# Patient Record
Sex: FEMALE | Race: WHITE | NOT HISPANIC OR LATINO | Employment: OTHER | URBAN - METROPOLITAN AREA
[De-identification: names, ages, dates, MRNs, and addresses within clinical notes are randomized per-mention and may not be internally consistent; named-entity substitution may affect disease eponyms.]

---

## 2023-10-30 ENCOUNTER — APPOINTMENT (OUTPATIENT)
Dept: RADIOLOGY | Facility: HOSPITAL | Age: 63
End: 2023-10-30
Payer: MEDICARE

## 2023-10-30 ENCOUNTER — HOSPITAL ENCOUNTER (OUTPATIENT)
Facility: HOSPITAL | Age: 63
Setting detail: OBSERVATION
Discharge: HOME | End: 2023-10-31
Attending: STUDENT IN AN ORGANIZED HEALTH CARE EDUCATION/TRAINING PROGRAM | Admitting: INTERNAL MEDICINE
Payer: MEDICARE

## 2023-10-30 DIAGNOSIS — E53.8 FOLATE DEFICIENCY: ICD-10-CM

## 2023-10-30 DIAGNOSIS — R06.02 SHORTNESS OF BREATH: Primary | ICD-10-CM

## 2023-10-30 DIAGNOSIS — R53.1 WEAKNESS: ICD-10-CM

## 2023-10-30 DIAGNOSIS — R26.81 UNSTEADY GAIT: ICD-10-CM

## 2023-10-30 DIAGNOSIS — D75.89 MACROCYTOSIS WITHOUT ANEMIA: ICD-10-CM

## 2023-10-30 DIAGNOSIS — F17.200 SMOKER: ICD-10-CM

## 2023-10-30 DIAGNOSIS — R26.2 AMBULATORY DYSFUNCTION: ICD-10-CM

## 2023-10-30 LAB
ALBUMIN SERPL BCP-MCNC: 3.8 G/DL (ref 3.4–5)
ALP SERPL-CCNC: 120 U/L (ref 33–136)
ALT SERPL W P-5'-P-CCNC: 43 U/L (ref 7–45)
ANION GAP SERPL CALC-SCNC: 17 MMOL/L (ref 10–20)
AST SERPL W P-5'-P-CCNC: 39 U/L (ref 9–39)
BASOPHILS # BLD AUTO: 0.04 X10*3/UL (ref 0–0.1)
BASOPHILS NFR BLD AUTO: 0.3 %
BILIRUB SERPL-MCNC: 0.7 MG/DL (ref 0–1.2)
BUN SERPL-MCNC: 6 MG/DL (ref 6–23)
CALCIUM SERPL-MCNC: 9.1 MG/DL (ref 8.6–10.3)
CARDIAC TROPONIN I PNL SERPL HS: 5 NG/L (ref 0–13)
CHLORIDE SERPL-SCNC: 93 MMOL/L (ref 98–107)
CO2 SERPL-SCNC: 27 MMOL/L (ref 21–32)
CREAT SERPL-MCNC: 0.63 MG/DL (ref 0.5–1.05)
D DIMER PPP FEU-MCNC: 1143 NG/ML FEU
EOSINOPHIL # BLD AUTO: 0 X10*3/UL (ref 0–0.7)
EOSINOPHIL NFR BLD AUTO: 0 %
ERYTHROCYTE [DISTWIDTH] IN BLOOD BY AUTOMATED COUNT: 12.2 % (ref 11.5–14.5)
FLUAV RNA RESP QL NAA+PROBE: NOT DETECTED
FLUBV RNA RESP QL NAA+PROBE: NOT DETECTED
GFR SERPL CREATININE-BSD FRML MDRD: >90 ML/MIN/1.73M*2
GLUCOSE SERPL-MCNC: 89 MG/DL (ref 74–99)
HCT VFR BLD AUTO: 46.4 % (ref 36–46)
HGB BLD-MCNC: 16.5 G/DL (ref 12–16)
IMM GRANULOCYTES # BLD AUTO: 0.06 X10*3/UL (ref 0–0.7)
IMM GRANULOCYTES NFR BLD AUTO: 0.5 % (ref 0–0.9)
LYMPHOCYTES # BLD AUTO: 1.12 X10*3/UL (ref 1.2–4.8)
LYMPHOCYTES NFR BLD AUTO: 9.4 %
MCH RBC QN AUTO: 45 PG (ref 26–34)
MCHC RBC AUTO-ENTMCNC: 35.6 G/DL (ref 32–36)
MCV RBC AUTO: 126 FL (ref 80–100)
MONOCYTES # BLD AUTO: 0.97 X10*3/UL (ref 0.1–1)
MONOCYTES NFR BLD AUTO: 8.2 %
NEUTROPHILS # BLD AUTO: 9.7 X10*3/UL (ref 1.2–7.7)
NEUTROPHILS NFR BLD AUTO: 81.6 %
NRBC BLD-RTO: 0 /100 WBCS (ref 0–0)
PLATELET # BLD AUTO: 280 X10*3/UL (ref 150–450)
PMV BLD AUTO: 9.5 FL (ref 7.5–11.5)
POTASSIUM SERPL-SCNC: 3.5 MMOL/L (ref 3.5–5.3)
PROT SERPL-MCNC: 7 G/DL (ref 6.4–8.2)
RBC # BLD AUTO: 3.67 X10*6/UL (ref 4–5.2)
RBC MORPH BLD: NORMAL
SARS-COV-2 RNA RESP QL NAA+PROBE: NOT DETECTED
SODIUM SERPL-SCNC: 133 MMOL/L (ref 136–145)
STOMATOCYTES BLD QL SMEAR: NORMAL
WBC # BLD AUTO: 11.9 X10*3/UL (ref 4.4–11.3)

## 2023-10-30 PROCEDURE — 36415 COLL VENOUS BLD VENIPUNCTURE: CPT

## 2023-10-30 PROCEDURE — 99285 EMERGENCY DEPT VISIT HI MDM: CPT | Mod: 25 | Performed by: STUDENT IN AN ORGANIZED HEALTH CARE EDUCATION/TRAINING PROGRAM

## 2023-10-30 PROCEDURE — 80053 COMPREHEN METABOLIC PANEL: CPT | Performed by: STUDENT IN AN ORGANIZED HEALTH CARE EDUCATION/TRAINING PROGRAM

## 2023-10-30 PROCEDURE — 87636 SARSCOV2 & INF A&B AMP PRB: CPT | Performed by: STUDENT IN AN ORGANIZED HEALTH CARE EDUCATION/TRAINING PROGRAM

## 2023-10-30 PROCEDURE — 85025 COMPLETE CBC W/AUTO DIFF WBC: CPT | Performed by: EMERGENCY MEDICINE

## 2023-10-30 PROCEDURE — 84132 ASSAY OF SERUM POTASSIUM: CPT | Performed by: EMERGENCY MEDICINE

## 2023-10-30 PROCEDURE — 99285 EMERGENCY DEPT VISIT HI MDM: CPT | Performed by: STUDENT IN AN ORGANIZED HEALTH CARE EDUCATION/TRAINING PROGRAM

## 2023-10-30 PROCEDURE — 85060 BLOOD SMEAR INTERPRETATION: CPT | Performed by: STUDENT IN AN ORGANIZED HEALTH CARE EDUCATION/TRAINING PROGRAM

## 2023-10-30 PROCEDURE — 2550000001 HC RX 255 CONTRASTS: Performed by: STUDENT IN AN ORGANIZED HEALTH CARE EDUCATION/TRAINING PROGRAM

## 2023-10-30 PROCEDURE — 85379 FIBRIN DEGRADATION QUANT: CPT

## 2023-10-30 PROCEDURE — 85025 COMPLETE CBC W/AUTO DIFF WBC: CPT | Performed by: STUDENT IN AN ORGANIZED HEALTH CARE EDUCATION/TRAINING PROGRAM

## 2023-10-30 PROCEDURE — 71046 X-RAY EXAM CHEST 2 VIEWS: CPT

## 2023-10-30 PROCEDURE — 84484 ASSAY OF TROPONIN QUANT: CPT | Performed by: EMERGENCY MEDICINE

## 2023-10-30 PROCEDURE — 84484 ASSAY OF TROPONIN QUANT: CPT | Performed by: STUDENT IN AN ORGANIZED HEALTH CARE EDUCATION/TRAINING PROGRAM

## 2023-10-30 PROCEDURE — 36415 COLL VENOUS BLD VENIPUNCTURE: CPT | Performed by: EMERGENCY MEDICINE

## 2023-10-30 PROCEDURE — 71046 X-RAY EXAM CHEST 2 VIEWS: CPT | Performed by: STUDENT IN AN ORGANIZED HEALTH CARE EDUCATION/TRAINING PROGRAM

## 2023-10-30 PROCEDURE — 71275 CT ANGIOGRAPHY CHEST: CPT | Mod: MG

## 2023-10-30 PROCEDURE — 87636 SARSCOV2 & INF A&B AMP PRB: CPT | Performed by: EMERGENCY MEDICINE

## 2023-10-30 PROCEDURE — 71275 CT ANGIOGRAPHY CHEST: CPT | Performed by: SURGERY

## 2023-10-30 RX ADMIN — IOHEXOL 60 ML: 350 INJECTION, SOLUTION INTRAVENOUS at 22:58

## 2023-10-30 ASSESSMENT — COLUMBIA-SUICIDE SEVERITY RATING SCALE - C-SSRS
1. IN THE PAST MONTH, HAVE YOU WISHED YOU WERE DEAD OR WISHED YOU COULD GO TO SLEEP AND NOT WAKE UP?: NO
2. HAVE YOU ACTUALLY HAD ANY THOUGHTS OF KILLING YOURSELF?: NO
6. HAVE YOU EVER DONE ANYTHING, STARTED TO DO ANYTHING, OR PREPARED TO DO ANYTHING TO END YOUR LIFE?: NO

## 2023-10-30 ASSESSMENT — LIFESTYLE VARIABLES
REASON UNABLE TO ASSESS: NO
EVER HAD A DRINK FIRST THING IN THE MORNING TO STEADY YOUR NERVES TO GET RID OF A HANGOVER: NO
HAVE PEOPLE ANNOYED YOU BY CRITICIZING YOUR DRINKING: NO
EVER FELT BAD OR GUILTY ABOUT YOUR DRINKING: NO
HAVE YOU EVER FELT YOU SHOULD CUT DOWN ON YOUR DRINKING: NO

## 2023-10-30 ASSESSMENT — PAIN - FUNCTIONAL ASSESSMENT: PAIN_FUNCTIONAL_ASSESSMENT: 0-10

## 2023-10-31 ENCOUNTER — HOME HEALTH ADMISSION (OUTPATIENT)
Dept: HOME HEALTH SERVICES | Facility: HOME HEALTH | Age: 63
End: 2023-10-31

## 2023-10-31 ENCOUNTER — APPOINTMENT (OUTPATIENT)
Dept: CARDIOLOGY | Facility: HOSPITAL | Age: 63
End: 2023-10-31
Payer: MEDICARE

## 2023-10-31 VITALS
WEIGHT: 108.03 LBS | HEART RATE: 92 BPM | OXYGEN SATURATION: 99 % | TEMPERATURE: 97.7 F | RESPIRATION RATE: 17 BRPM | DIASTOLIC BLOOD PRESSURE: 79 MMHG | HEIGHT: 67 IN | SYSTOLIC BLOOD PRESSURE: 133 MMHG | BODY MASS INDEX: 16.96 KG/M2

## 2023-10-31 PROBLEM — F17.200 SMOKER: Status: ACTIVE | Noted: 2023-10-31

## 2023-10-31 PROBLEM — D75.89 MACROCYTOSIS WITHOUT ANEMIA: Status: ACTIVE | Noted: 2023-10-31

## 2023-10-31 PROBLEM — R26.2 AMBULATORY DYSFUNCTION: Status: ACTIVE | Noted: 2023-10-31

## 2023-10-31 PROBLEM — R53.1 WEAKNESS: Status: ACTIVE | Noted: 2023-10-31

## 2023-10-31 LAB
ALBUMIN SERPL BCP-MCNC: 3.4 G/DL (ref 3.4–5)
ALP SERPL-CCNC: 100 U/L (ref 33–136)
ALT SERPL W P-5'-P-CCNC: 33 U/L (ref 7–45)
ANION GAP SERPL CALC-SCNC: 17 MMOL/L (ref 10–20)
APPEARANCE UR: CLEAR
AST SERPL W P-5'-P-CCNC: 29 U/L (ref 9–39)
ATRIAL RATE: 99 BPM
BILIRUB SERPL-MCNC: 0.7 MG/DL (ref 0–1.2)
BILIRUB UR STRIP.AUTO-MCNC: NEGATIVE MG/DL
BUN SERPL-MCNC: 8 MG/DL (ref 6–23)
CALCIUM SERPL-MCNC: 8.8 MG/DL (ref 8.6–10.3)
CHLORIDE SERPL-SCNC: 96 MMOL/L (ref 98–107)
CO2 SERPL-SCNC: 24 MMOL/L (ref 21–32)
COLOR UR: YELLOW
CREAT SERPL-MCNC: 0.57 MG/DL (ref 0.5–1.05)
ERYTHROCYTE [DISTWIDTH] IN BLOOD BY AUTOMATED COUNT: 12.5 % (ref 11.5–14.5)
FOLATE SERPL-MCNC: 2.4 NG/ML
GFR SERPL CREATININE-BSD FRML MDRD: >90 ML/MIN/1.73M*2
GLUCOSE SERPL-MCNC: 79 MG/DL (ref 74–99)
GLUCOSE UR STRIP.AUTO-MCNC: NEGATIVE MG/DL
HCT VFR BLD AUTO: 38.3 % (ref 36–46)
HGB BLD-MCNC: 13.7 G/DL (ref 12–16)
KETONES UR STRIP.AUTO-MCNC: ABNORMAL MG/DL
LEUKOCYTE ESTERASE UR QL STRIP.AUTO: NEGATIVE
MAGNESIUM SERPL-MCNC: 2.08 MG/DL (ref 1.6–2.4)
MCH RBC QN AUTO: 45.2 PG (ref 26–34)
MCHC RBC AUTO-ENTMCNC: 35.8 G/DL (ref 32–36)
MCV RBC AUTO: 126 FL (ref 80–100)
NITRITE UR QL STRIP.AUTO: NEGATIVE
NRBC BLD-RTO: 0 /100 WBCS (ref 0–0)
P AXIS: 75 DEGREES
P OFFSET: 201 MS
P ONSET: 157 MS
PATH REVIEW-CBC DIFFERENTIAL: NORMAL
PH UR STRIP.AUTO: 6 [PH]
PHOSPHATE SERPL-MCNC: 3.4 MG/DL (ref 2.5–4.9)
PLATELET # BLD AUTO: 284 X10*3/UL (ref 150–450)
PMV BLD AUTO: 9.2 FL (ref 7.5–11.5)
POTASSIUM SERPL-SCNC: 3.4 MMOL/L (ref 3.5–5.3)
PR INTERVAL: 134 MS
PROT SERPL-MCNC: 6.1 G/DL (ref 6.4–8.2)
PROT UR STRIP.AUTO-MCNC: ABNORMAL MG/DL
Q ONSET: 224 MS
QRS COUNT: 16 BEATS
QRS DURATION: 66 MS
QT INTERVAL: 358 MS
QTC CALCULATION(BAZETT): 459 MS
QTC FREDERICIA: 423 MS
R AXIS: 59 DEGREES
RBC # BLD AUTO: 3.03 X10*6/UL (ref 4–5.2)
RBC # UR STRIP.AUTO: ABNORMAL /UL
RBC #/AREA URNS AUTO: NORMAL /HPF
SODIUM SERPL-SCNC: 134 MMOL/L (ref 136–145)
SP GR UR STRIP.AUTO: 1.02
SQUAMOUS #/AREA URNS AUTO: NORMAL /HPF
T AXIS: 6 DEGREES
T OFFSET: 403 MS
TSH SERPL-ACNC: 3.27 MIU/L (ref 0.44–3.98)
UROBILINOGEN UR STRIP.AUTO-MCNC: 2 MG/DL
VENTRICULAR RATE: 99 BPM
VIT B12 SERPL-MCNC: 291 PG/ML (ref 211–911)
WBC # BLD AUTO: 10.5 X10*3/UL (ref 4.4–11.3)
WBC #/AREA URNS AUTO: NORMAL /HPF

## 2023-10-31 PROCEDURE — 99222 1ST HOSP IP/OBS MODERATE 55: CPT | Performed by: NURSE PRACTITIONER

## 2023-10-31 PROCEDURE — 81001 URINALYSIS AUTO W/SCOPE: CPT

## 2023-10-31 PROCEDURE — 97165 OT EVAL LOW COMPLEX 30 MIN: CPT | Mod: GO | Performed by: OCCUPATIONAL THERAPIST

## 2023-10-31 PROCEDURE — 93005 ELECTROCARDIOGRAM TRACING: CPT

## 2023-10-31 PROCEDURE — 99239 HOSP IP/OBS DSCHRG MGMT >30: CPT | Performed by: NURSE PRACTITIONER

## 2023-10-31 PROCEDURE — 84100 ASSAY OF PHOSPHORUS: CPT | Performed by: NURSE PRACTITIONER

## 2023-10-31 PROCEDURE — 82746 ASSAY OF FOLIC ACID SERUM: CPT | Mod: STJLAB | Performed by: INTERNAL MEDICINE

## 2023-10-31 PROCEDURE — 36415 COLL VENOUS BLD VENIPUNCTURE: CPT | Performed by: INTERNAL MEDICINE

## 2023-10-31 PROCEDURE — 99223 1ST HOSP IP/OBS HIGH 75: CPT | Performed by: INTERNAL MEDICINE

## 2023-10-31 PROCEDURE — 94760 N-INVAS EAR/PLS OXIMETRY 1: CPT

## 2023-10-31 PROCEDURE — 82607 VITAMIN B-12: CPT | Mod: STJLAB | Performed by: INTERNAL MEDICINE

## 2023-10-31 PROCEDURE — 97116 GAIT TRAINING THERAPY: CPT | Mod: GP | Performed by: PHYSICAL THERAPIST

## 2023-10-31 PROCEDURE — G0378 HOSPITAL OBSERVATION PER HR: HCPCS

## 2023-10-31 PROCEDURE — 2500000004 HC RX 250 GENERAL PHARMACY W/ HCPCS (ALT 636 FOR OP/ED): Performed by: NURSE PRACTITIONER

## 2023-10-31 PROCEDURE — 83735 ASSAY OF MAGNESIUM: CPT | Performed by: NURSE PRACTITIONER

## 2023-10-31 PROCEDURE — 97161 PT EVAL LOW COMPLEX 20 MIN: CPT | Mod: GP | Performed by: PHYSICAL THERAPIST

## 2023-10-31 PROCEDURE — 80053 COMPREHEN METABOLIC PANEL: CPT | Performed by: INTERNAL MEDICINE

## 2023-10-31 PROCEDURE — 84443 ASSAY THYROID STIM HORMONE: CPT | Performed by: STUDENT IN AN ORGANIZED HEALTH CARE EDUCATION/TRAINING PROGRAM

## 2023-10-31 PROCEDURE — 85027 COMPLETE CBC AUTOMATED: CPT | Performed by: INTERNAL MEDICINE

## 2023-10-31 PROCEDURE — 2500000004 HC RX 250 GENERAL PHARMACY W/ HCPCS (ALT 636 FOR OP/ED): Performed by: INTERNAL MEDICINE

## 2023-10-31 RX ORDER — PANTOPRAZOLE SODIUM 40 MG/1
40 TABLET, DELAYED RELEASE ORAL
Status: DISCONTINUED | OUTPATIENT
Start: 2023-10-31 | End: 2023-10-31 | Stop reason: HOSPADM

## 2023-10-31 RX ORDER — ACETAMINOPHEN 325 MG/1
650 TABLET ORAL EVERY 4 HOURS PRN
Status: DISCONTINUED | OUTPATIENT
Start: 2023-10-31 | End: 2023-10-31 | Stop reason: HOSPADM

## 2023-10-31 RX ORDER — ONDANSETRON HYDROCHLORIDE 2 MG/ML
4 INJECTION, SOLUTION INTRAVENOUS EVERY 8 HOURS PRN
Status: DISCONTINUED | OUTPATIENT
Start: 2023-10-31 | End: 2023-10-31 | Stop reason: HOSPADM

## 2023-10-31 RX ORDER — ACETAMINOPHEN 650 MG/1
650 SUPPOSITORY RECTAL EVERY 4 HOURS PRN
Status: DISCONTINUED | OUTPATIENT
Start: 2023-10-31 | End: 2023-10-31 | Stop reason: HOSPADM

## 2023-10-31 RX ORDER — ACETAMINOPHEN 160 MG/5ML
650 SOLUTION ORAL EVERY 4 HOURS PRN
Status: DISCONTINUED | OUTPATIENT
Start: 2023-10-31 | End: 2023-10-31 | Stop reason: HOSPADM

## 2023-10-31 RX ORDER — METOCLOPRAMIDE 10 MG/1
10 TABLET ORAL EVERY 6 HOURS PRN
Status: DISCONTINUED | OUTPATIENT
Start: 2023-10-31 | End: 2023-10-31 | Stop reason: HOSPADM

## 2023-10-31 RX ORDER — METOCLOPRAMIDE HYDROCHLORIDE 5 MG/ML
10 INJECTION INTRAMUSCULAR; INTRAVENOUS EVERY 6 HOURS PRN
Status: DISCONTINUED | OUTPATIENT
Start: 2023-10-31 | End: 2023-10-31 | Stop reason: HOSPADM

## 2023-10-31 RX ORDER — POTASSIUM CHLORIDE 20 MEQ/1
40 TABLET, EXTENDED RELEASE ORAL ONCE
Status: COMPLETED | OUTPATIENT
Start: 2023-10-31 | End: 2023-10-31

## 2023-10-31 RX ORDER — POLYETHYLENE GLYCOL 3350 17 G/17G
17 POWDER, FOR SOLUTION ORAL DAILY PRN
Status: DISCONTINUED | OUTPATIENT
Start: 2023-10-31 | End: 2023-10-31 | Stop reason: HOSPADM

## 2023-10-31 RX ORDER — PANTOPRAZOLE SODIUM 40 MG/10ML
40 INJECTION, POWDER, LYOPHILIZED, FOR SOLUTION INTRAVENOUS
Status: DISCONTINUED | OUTPATIENT
Start: 2023-10-31 | End: 2023-10-31 | Stop reason: HOSPADM

## 2023-10-31 RX ORDER — ONDANSETRON 4 MG/1
4 TABLET, ORALLY DISINTEGRATING ORAL EVERY 8 HOURS PRN
Status: DISCONTINUED | OUTPATIENT
Start: 2023-10-31 | End: 2023-10-31 | Stop reason: HOSPADM

## 2023-10-31 RX ORDER — FOLIC ACID 1 MG/1
1 TABLET ORAL DAILY
Qty: 30 TABLET | Refills: 0 | Status: SHIPPED | OUTPATIENT
Start: 2023-10-31 | End: 2023-11-30

## 2023-10-31 RX ORDER — TALC
3 POWDER (GRAM) TOPICAL NIGHTLY PRN
Status: DISCONTINUED | OUTPATIENT
Start: 2023-10-31 | End: 2023-10-31 | Stop reason: HOSPADM

## 2023-10-31 RX ADMIN — POTASSIUM CHLORIDE 40 MEQ: 1500 TABLET, EXTENDED RELEASE ORAL at 12:18

## 2023-10-31 RX ADMIN — PANTOPRAZOLE SODIUM 40 MG: 40 TABLET, DELAYED RELEASE ORAL at 06:29

## 2023-10-31 SDOH — SOCIAL STABILITY: SOCIAL INSECURITY: DO YOU FEEL UNSAFE GOING BACK TO THE PLACE WHERE YOU ARE LIVING?: NO

## 2023-10-31 SDOH — SOCIAL STABILITY: SOCIAL INSECURITY: ARE YOU OR HAVE YOU BEEN THREATENED OR ABUSED PHYSICALLY, EMOTIONALLY, OR SEXUALLY BY ANYONE?: NO

## 2023-10-31 SDOH — SOCIAL STABILITY: SOCIAL INSECURITY: HAS ANYONE EVER THREATENED TO HURT YOUR FAMILY OR YOUR PETS?: NO

## 2023-10-31 SDOH — SOCIAL STABILITY: SOCIAL INSECURITY: WERE YOU ABLE TO COMPLETE ALL THE BEHAVIORAL HEALTH SCREENINGS?: YES

## 2023-10-31 SDOH — SOCIAL STABILITY: SOCIAL INSECURITY: DOES ANYONE TRY TO KEEP YOU FROM HAVING/CONTACTING OTHER FRIENDS OR DOING THINGS OUTSIDE YOUR HOME?: NO

## 2023-10-31 SDOH — SOCIAL STABILITY: SOCIAL INSECURITY: ABUSE: ADULT

## 2023-10-31 SDOH — SOCIAL STABILITY: SOCIAL INSECURITY: HAVE YOU HAD THOUGHTS OF HARMING ANYONE ELSE?: NO

## 2023-10-31 SDOH — SOCIAL STABILITY: SOCIAL INSECURITY: ARE THERE ANY APPARENT SIGNS OF INJURIES/BEHAVIORS THAT COULD BE RELATED TO ABUSE/NEGLECT?: NO

## 2023-10-31 SDOH — SOCIAL STABILITY: SOCIAL INSECURITY: DO YOU FEEL ANYONE HAS EXPLOITED OR TAKEN ADVANTAGE OF YOU FINANCIALLY OR OF YOUR PERSONAL PROPERTY?: NO

## 2023-10-31 ASSESSMENT — BALANCE ASSESSMENTS
STANDING UNSUPPORTED WITH FEET TOGETHER: NEEDS HELP TO ATTAIN POSITION BUT ABLE TO STAND 15 SECONDS FEET TOGETHER
TRANSFERS: NEEDS ONE PERSON TO ASSIST
STANDING TO SITTING: NEEDS MINIMAL AID TO STAND OR STABILIZE
PLACE ALTERNATE FOOT ON STEP OR STOOL WHILE STANDING UNSUPPORTED: NEEDS ASSISTANCE TO KEEP FROM FALLING/UNABLE TO TRY
STANDING UNSUPPORTED WITH EYES CLOSED: UNABLE TO KEEP EYES CLOSED 3 SECONDS BUT STAYS SAFELY
TURN 360 DEGREES: NEEDS ASSISTANCE WHILE TURNING
STANDING TO SITTING: USES BACK OF LEGS AGAINST CHAIR TO CONTROL DESCENT
PLACE ALTERNATE FOOT ON STEP OR STOOL WHILE STANDING UNSUPPORTED: NEEDS ASSIST TO KEEP FROM LOSING BALANCE OR FALLING
STANDING UNSUPPORTED ONE FOOT IN FRONT: LOSES BALANCE WHILE STEPPING OR STANDING
LONG VERSION TOTAL SCORE (MAX 56): 13
STANDING UNSUPPORTED: NEEDS SEVERAL TRIES TO STAND 30 SECONDS UNSUPPORTED
STANDING ON ONE LEG: UNABLE TO TRY NEEDS ASSIST TO PREVENT FALL
REACHING FORWARD WITH OUTSTRETCHED ARM WHILE STANDING: CAN REACH FORWARD 5 CM (2 INCHES)
SITTING WITH BACK UNSUPPORTED BUT FEET SUPPORTED ON FLOOR OR ON A STOOL: ABLE TO SIT 30 SECONDS
PICK UP OBJECT FROM THE FLOOR FROM A STANDING POSITION: UNABLE TO PICK UP BUT REACHES 2-5 CM (1-2 INCHES) FROM SLIPPER AND KEEPS BALANCE INDEPENDENTLY

## 2023-10-31 ASSESSMENT — ACTIVITIES OF DAILY LIVING (ADL)
ADEQUATE_TO_COMPLETE_ADL: YES
WALKS IN HOME: NEEDS ASSISTANCE
DRESSING YOURSELF: INDEPENDENT
BATHING: INDEPENDENT
TOILETING: INDEPENDENT
LACK_OF_TRANSPORTATION: NO
HEARING - RIGHT EAR: FUNCTIONAL
HEARING - LEFT EAR: FUNCTIONAL
JUDGMENT_ADEQUATE_SAFELY_COMPLETE_DAILY_ACTIVITIES: YES
PATIENT'S MEMORY ADEQUATE TO SAFELY COMPLETE DAILY ACTIVITIES?: YES
FEEDING YOURSELF: INDEPENDENT
GROOMING: INDEPENDENT

## 2023-10-31 ASSESSMENT — COGNITIVE AND FUNCTIONAL STATUS - GENERAL
MOVING FROM LYING ON BACK TO SITTING ON SIDE OF FLAT BED WITH BEDRAILS: A LITTLE
PATIENT BASELINE BEDBOUND: NO
CLIMB 3 TO 5 STEPS WITH RAILING: A LOT
HELP NEEDED FOR BATHING: A LITTLE
WALKING IN HOSPITAL ROOM: A LITTLE
TURNING FROM BACK TO SIDE WHILE IN FLAT BAD: A LITTLE
STANDING UP FROM CHAIR USING ARMS: A LITTLE
MOBILITY SCORE: 22
PERSONAL GROOMING: A LITTLE
MOVING TO AND FROM BED TO CHAIR: A LITTLE
WALKING IN HOSPITAL ROOM: A LITTLE
MOBILITY SCORE: 17
CLIMB 3 TO 5 STEPS WITH RAILING: A LITTLE
DAILY ACTIVITIY SCORE: 24
TOILETING: A LOT
DRESSING REGULAR LOWER BODY CLOTHING: A LOT
DAILY ACTIVITIY SCORE: 18

## 2023-10-31 ASSESSMENT — PAIN SCALES - GENERAL
PAINLEVEL_OUTOF10: 0 - NO PAIN

## 2023-10-31 ASSESSMENT — ENCOUNTER SYMPTOMS
SHORTNESS OF BREATH: 1
WEAKNESS: 1

## 2023-10-31 ASSESSMENT — LIFESTYLE VARIABLES
PRESCIPTION_ABUSE_PAST_12_MONTHS: NO
HOW MANY STANDARD DRINKS CONTAINING ALCOHOL DO YOU HAVE ON A TYPICAL DAY: PATIENT DOES NOT DRINK
HOW OFTEN DO YOU HAVE 6 OR MORE DRINKS ON ONE OCCASION: NEVER
SUBSTANCE_ABUSE_PAST_12_MONTHS: NO
HOW OFTEN DO YOU HAVE A DRINK CONTAINING ALCOHOL: NEVER
AUDIT-C TOTAL SCORE: 0
AUDIT-C TOTAL SCORE: 0
SKIP TO QUESTIONS 9-10: 1

## 2023-10-31 ASSESSMENT — PAIN - FUNCTIONAL ASSESSMENT
PAIN_FUNCTIONAL_ASSESSMENT: 0-10

## 2023-10-31 ASSESSMENT — PATIENT HEALTH QUESTIONNAIRE - PHQ9
2. FEELING DOWN, DEPRESSED OR HOPELESS: NOT AT ALL
SUM OF ALL RESPONSES TO PHQ9 QUESTIONS 1 & 2: 0
1. LITTLE INTEREST OR PLEASURE IN DOING THINGS: NOT AT ALL

## 2023-10-31 NOTE — CONSULTS
Reason for consult  Weight loss, early satiety    HPI  Beatris Fontanez is a 63 y.o. female presenting with progressively increasing shortness of breath with exertion.  Breathing has progressively worsened over the past 5 months making ambulation more and more difficult.  She notes dizziness, weakness, unsteadiness on her feet.  She did have a fall recently with no injury.  She reports issues swallowing pills though no odynophasia. She endorses that this may be at least partially psychological.  The pills get to a certain point and then sometimes she will throw up or spit them up.  She will sometimes coat her pills in butter to help them slide down.  She does not report issues swallowing solid food.  She does report that her brain tells her after she has eaten half a Mela's meal that she should stop. She reports that sometimes it feels as if there is a lump in her stomach after eating rather than actually feeling full. She knows this decreased oral intake has caused her to lose weight. She also states that if she takes a nap after eating she may have an emesis when she wakes up.  She is unsure how much weight she has lost as she does not weigh herself.  She believes her baseline is 130-135 pounds with admission weight of 108 per patient.  She also endorses intermittent diarrhea and constipation.  She does not take anything for either.  She has never had an EGD.  Her last colonoscopy was >10 years ago.  She is not on PPI.  No chronic NSAIDs.  No anticoagulation.  No hematochezia or melena or hematemesis.  She does not have a PCP.    Labs note mild leukocytosis now resolved.  Macrocytic hemoglobin initially of 16.5, platelets 280. Sodium 133, D-dimer 1143 with negative CT PE.     PMH  She has a past medical history of Other specified health status.    PSH  Tubal ligation, hand surgery    Family  No known GI malignancies    Social  Current tobacco smoker-1 pack every couple days, drinks 1 glass to 1 bottle of wine  "daily.  No illicits.      Review of Systems  ROS negative unless stated otherwise in HPI     Objective  /79 (BP Location: Right arm, Patient Position: Lying)   Pulse 92   Temp 36.5 °C (97.7 °F) (Temporal)   Resp 17   Ht 1.708 m (5' 7.24\")   Wt 49 kg (108 lb 0.4 oz)   SpO2 99%   BMI 16.80 kg/m²     Physical Exam  Constitutional: Alert, pleasant and interactive thin female, in NAD  Eyes: PERRL, sclera clear, no conjunctival injection  Skin: Warm and dry, no rash or ecchymosis  ENMT: Mucous membranes moist, no lesions noted  Resp: CTAB, even and unlabored  CV: RRR, normal S1, S2, no m,r,g  GI: +BS, soft, round, NT, no rebound tenderness or guarding, no palpable masses or organomegaly  MSK: 5/5 strength, ROM intact, no joint swelling  Extremities: Extremities warm, no edema, contusions, wounds or cyanosis  Neuro: Alert and oriented x3  Psych: Appropriate mood and behavior    Medications  Scheduled medications  iohexol, 60 mL, intravenous, Once in imaging  pantoprazole, 40 mg, oral, Daily before breakfast   Or  pantoprazole, 40 mg, intravenous, Daily before breakfast      Continuous medications     PRN medications  PRN medications: acetaminophen **OR** acetaminophen **OR** acetaminophen, acetaminophen **OR** acetaminophen **OR** acetaminophen, melatonin, metoclopramide **OR** metoclopramide, ondansetron ODT **OR** ondansetron, polyethylene glycol     Labs  Lab Results   Component Value Date    WBC 10.5 10/31/2023    HGB 13.7 10/31/2023    HCT 38.3 10/31/2023     (H) 10/31/2023     10/31/2023     Lab Results   Component Value Date    GLUCOSE 89 10/30/2023    CALCIUM 9.1 10/30/2023     (L) 10/30/2023    K 3.5 10/30/2023    CO2 27 10/30/2023    CL 93 (L) 10/30/2023    BUN 6 10/30/2023    CREATININE 0.63 10/30/2023     Lab Results   Component Value Date    ALT 43 10/30/2023    AST 39 10/30/2023    ALKPHOS 120 10/30/2023    BILITOT 0.7 10/30/2023     No results found for: \"IRON\", \"TIBC\", " "\"FERRITIN\"  No results found for: \"INR\", \"PROTIME\"    Radiology  CT PE protocol 10/30/2023 noting  Impression:     No evidence of acute pulmonary embolism. No evidence of acute  pathology in the chest.     Signed by: Noel Shrestha 10/31/2023 12:23 AM  Dictation workstation:   [id number]       Assessment/Plan  Beatris Fontanez is a 63 y.o. female presenting with weakness, increasing SOB on exertion, weight loss.  Shortness of breath not related to anemia as hemoglobin was 16.5 on arrival.  Patient has had 25 to 30 pound weight loss over last several year.  She does acknowledge that this there may be some psychological component to not wanting to complete a full meal.  She does have issues swallowing pills though no odynophagia or issues swallowing solid food.  No previous EGD.  Last colonoscopy >10 years ago.    # weight loss  # SOB with exertion- non GI related  # dysphagia with psychological component     Plan:  - continue supportive care  - diet as tolerated  - pt can schedule for outpatient EGD/colonoscopy- discussed with pt and placed on discharge profile  - no indication for urgent inpatient GI procedures  - pt ok for discharge from GI standpoint    Thank you for allowing us to participate in care. Please call with any further questions or concerns.      Plan has been discussed with Dr. Sam. GI will sign off.     JULIAN Burch/JAD     "

## 2023-10-31 NOTE — CARE PLAN
The patient's goals for the shift include patient will be hemodynamically stable through this shift    The clinical goals for the shift include patient will be able to ambulate without pulse dropping below 92% r/a    Over the shift, the patient did not make progress toward the following goals. Barriers to progression include ***. Recommendations to address these barriers include ***.

## 2023-10-31 NOTE — DISCHARGE INSTRUCTIONS
Follow up with PCP within 1 week of discharge  Resume home medications  Alcohol/smoking cessation strongly encouraged  Hari script

## 2023-10-31 NOTE — PROGRESS NOTES
Physical Therapy    Physical Therapy Evaluation    Patient Name: Beatris Fontanez  MRN: [id number]  Today's Date: 10/31/2023   Time Calculation  Start Time: 0958  Stop Time: 1016  Time Calculation (min): 18 min    Assessment/Plan   PT Assessment  PT Assessment Results: Decreased strength, Decreased endurance, Impaired balance  Rehab Prognosis: Good  Medical Staff Made Aware: Yes  End of Session Communication: Bedside nurse  Assessment Comment: Pt is a 63 y.o. female admitted for Shortness of breath [R06.02] Unsteady gait [R26.81] Weakness [R53.1] on 10/30/2023. Pt below functional level and will benefit from skilled therapy in the hospital as well as moderate intensity therapy once medically stable for discharge. Therapy will continue to follow and reassess each session. Pt scored a 13 on the Funes balance scale indicating a high risk of falls.     End of Session Patient Position: Up in chair, Alarm on  IP OR SWING BED PT PLAN  Inpatient or Swing Bed: Inpatient  PT Plan  Treatment/Interventions: Transfer training, Gait training, Stair training, Balance training, Neuromuscular re-education, Strengthening, Endurance training, Therapeutic exercise, Therapeutic activity  PT Plan: Skilled PT  PT Frequency: 4 times per week  PT Discharge Recommendations: High intensity level of continued care  Equipment Recommended upon Discharge: Wheeled walker  PT Recommended Transfer Status: Assist x1    Subjective     Current Problem:  Patient Active Problem List   Diagnosis    Weakness    Ambulatory dysfunction    Macrocytosis without anemia    Smoker       General Visit Information:  General  Reason for Referral: impaired mobility  Referred By: Dr. Lott  Past Medical History Relevant to Rehab: ambulation dysfunction, dysphagia, macrocytosis, smoker, increased D Dimer, negative for PE  Prior to Session Communication: Bedside nurse  Patient Position Received: Bed, 3 rail up  Preferred Learning Style: kinesthetic, verbal  General Comment:  Pt agreeable to therapy    Home Living:  Home Living  Type of Home: House  Lives With: Alone  Home Layout: Two level  Home Access: Stairs to enter with rails  Entrance Stairs-Number of Steps: 2  Bathroom Shower/Tub: Walk-in shower  Bathroom Equipment: Shower chair with back    Prior Level of Function:  Prior Function Per Pt/Caregiver Report  Level of Lewis: Independent with ADLs and functional transfers    Precautions:  Precautions  Medical Precautions: Fall precautions    Vital Signs:     Objective     Pain:  Pain Assessment  Pain Assessment: 0-10  Pain Score: 0 - No pain    Cognition:  Cognition  Overall Cognitive Status: Within Functional Limits  Orientation Level: Oriented X4  Insight: Mild  Impulsive: Moderately  Processing Speed: Delayed    General Assessments:      Activity Tolerance  Endurance: Endurance does not limit participation in activity  Sensation  Sensation Comment: numbness and tingling on bottoms of feet              Static Sitting Balance  Static Sitting-Level of Assistance: Close supervision       Functional Assessments:  ADL  Functional Assistance: Minimal  Functional Deficit: Steadying  Bed Mobility  Bed Mobility: Yes (Patient supine to sit with SBA using handrails.)  Transfers  Transfer: Yes  Transfer 1  Transfer From 1: Sit to  Transfer to 1: Stand  Technique 1: Stand pivot  Transfer Level of Assistance 1: Minimum assistance  Transfers 2  Transfer From 2: Stand to  Transfer to 2: Chair with arms  Technique 2: Stand pivot  Transfer Level of Assistance 2: Minimum assistance  Ambulation/Gait Training  Ambulation/Gait Training Performed: Yes  Ambulation/Gait Training 1  Surface 1: Level tile  Device 1: Rolling walker  Assistance 1: Minimum assistance  Comments/Distance (ft) 1: 200 (10 ft walking backward, modA)          Extremity/Trunk Assessments:        RLE   RLE :  (grossly 4-/5)  LLE   LLE :  (grossly 4-/5)    Outcome Measures:  [facility] Basic Mobility  Turning from your back to your  side while in a flat bed without using bedrails: A little  Moving from lying on your back to sitting on the side of a flat bed without using bedrails: A little  Moving to and from bed to chair (including a wheelchair): A little  Standing up from a chair using your arms (e.g. wheelchair or bedside chair): A little  To walk in hospital room: A little  Climbing 3-5 steps with railing: A lot  Basic Mobility - Total Score: 17  Funes Balance Scale  1. Sitting to Standing: Needs minimal aid to stand or stabilize  2. Standing Unsupported: Needs several tries to stand 30 seconds unsupported  3. Sitting with Back Unsupported but Feet Supported on Floor or on a Stool: Able to sit 30 seconds  4. Standing to Sitting: Uses back of legs against chair to control descent  5.  Transfers: Needs one person to assist  6. Standing Unsupported with Eyes Closed: Unable to keep eyes closed 3 seconds but stays safely  7. Standing Unsupported with Feet Together: Needs help to attain position but able to stand 15 seconds feet together  8. Reach Forward with Outstretched Arm While Standing: Can reach forward 5 cm (2 inches)  9.  Object from Floor from a Standing Position: Unable to  but reaches 2-5 cm (1-2 inches) from slipper and keeps balance independently  10. Turning to Look Behind Over Left and Right Shoulders While Standing: Needs assist to keep from losing balance or falling  11. Turn 360 Degrees: Needs assistance while turning  12. Place Alternate Foot on Step or Stool While Standing Unsupported: Needs assistance to keep from falling/unable to try  13. Standing Unsupported One Foot in Front: Loses balance while stepping or standing  14. Standing on One Leg: Unable to try needs assist to prevent fall  Funes Balance Score: 13                         Goals:  Encounter Problems       Encounter Problems (Active)       PT Problem       Pt will demonstrate mod I for all bed mobility   (Progressing)       Start:  10/31/23     Expected End:  11/14/23            Pt will demonstrate mod I for all transfers with WW  (Progressing)       Start:  10/31/23    Expected End:  11/14/23            Pt will ambulate 250 ft with WW and mod I.  (Progressing)       Start:  10/31/23    Expected End:  11/14/23               PT Problem       Pt will be able to negotiation 4-8 steps with HR and CGA.  (Progressing)       Start:  10/31/23    Expected End:  11/14/23                 Education Documentation  Body Mechanics, taught by Kelly Lozano, PT at 10/31/2023  2:52 PM.  Learner: Patient  Readiness: Acceptance  Method: Explanation  Response: Verbalizes Understanding    Home Exercise Program, taught by Kelly Lozano, PT at 10/31/2023  2:52 PM.  Learner: Patient  Readiness: Acceptance  Method: Explanation  Response: Verbalizes Understanding    Mobility Training, taught by Kelly Lozano, PT at 10/31/2023  2:52 PM.  Learner: Patient  Readiness: Acceptance  Method: Explanation  Response: Verbalizes Understanding    Education Comments  No comments found.

## 2023-10-31 NOTE — H&P
History Of Present Illness  Beatris Fontanez is a 63 y.o. female presenting with chief complaints of shortness of breath which has been ongoing for the past 5 months but has progressively worsened over the last week.  She states that at baseline she is able to complete her ADLs independently.  She does live alone but states that she has a friend that will often spend time with her and check in on her.  She states that even he appreciates more acute decompensation in her conditioning.  Patient is a current smoker and does endorse 1/2 pack/day.  She does drink a half a glass of wine per day but denies any history of substance abuse or EtOH abuse.  States that her appetite has been very poor but she chronically has a suppressed appetite.  She states that she felt like she was doing better over the last year by increasing her intake of fruits and vegetables by eating more smoothies however states that since her favorite smoothie store closed, she has abandon that.  Overall she states she feels worn down.  In addition, patient states that she has had increased difficulty eating.  She describes early satiety which she can only eat half of a sandwich or a few bites of an apple.  She denies any globus sensation or the feeling as if something cannot pass from her esophagus to stomach.  But she states that she believes she has lost weight unintentionally as a result of this.  Laboratory studies upon arrival are grossly unremarkable.  Evidence of macrocytosis but without concurrent anemia.  Patient admitted for further evaluation..     Past Medical History  Past Medical History:   Diagnosis Date    Other specified health status     No pertinent past medical history       Surgical History  No past surgical history on file.     Social History  She has no history on file for tobacco use, alcohol use, and drug use.    Family History  No family history on file.     Allergies  Patient has no known allergies.    Review of Systems  "  Respiratory:  Positive for shortness of breath.    Neurological:  Positive for weakness.   All other systems reviewed and are negative.       Physical Exam  Vitals reviewed.   Constitutional:       Comments: Patient is resting comfortably.  Appears frail/thin, is acutely responsive when questioned, displays good insight, alert and oriented x4   HENT:      Head: Normocephalic and atraumatic.      Nose: Nose normal.      Mouth/Throat:      Mouth: Mucous membranes are moist.   Eyes:      Extraocular Movements: Extraocular movements intact.      Conjunctiva/sclera: Conjunctivae normal.      Pupils: Pupils are equal, round, and reactive to light.   Cardiovascular:      Rate and Rhythm: Normal rate and regular rhythm.      Pulses: Normal pulses.      Heart sounds: Normal heart sounds.   Pulmonary:      Effort: Pulmonary effort is normal.      Breath sounds: Normal breath sounds.   Abdominal:      General: Bowel sounds are normal.      Palpations: Abdomen is soft.   Musculoskeletal:         General: Normal range of motion.      Cervical back: Normal range of motion and neck supple.   Skin:     General: Skin is warm and dry.   Neurological:      General: No focal deficit present.      Mental Status: She is alert. Mental status is at baseline.   Psychiatric:         Mood and Affect: Mood normal.         Behavior: Behavior normal.          Last Recorded Vitals  Blood pressure 133/82, pulse 94, temperature 36.4 °C (97.5 °F), resp. rate 18, height 1.702 m (5' 7\"), weight 54.4 kg (120 lb), SpO2 97 %.    Relevant Results  Scheduled medications  iohexol, 60 mL, intravenous, Once in imaging  pantoprazole, 40 mg, oral, Daily before breakfast   Or  pantoprazole, 40 mg, intravenous, Daily before breakfast      Continuous medications     PRN medications  PRN medications: acetaminophen **OR** acetaminophen **OR** acetaminophen, acetaminophen **OR** acetaminophen **OR** acetaminophen, melatonin, metoclopramide **OR** metoclopramide, " ondansetron ODT **OR** ondansetron, polyethylene glycol  CT angio chest for pulmonary embolism    Result Date: 10/31/2023  Interpreted By:  Noel Shrestha, STUDY: CT ANGIO CHEST FOR PULMONARY EMBOLISM;  10/30/2023 11:03 pm   INDICATION: Signs/Symptoms:SOB, elevated dimer.   COMPARISON: None   ACCESSION NUMBER(S): [id number]   ORDERING CLINICIAN: BARON GALE   TECHNIQUE: Helical data acquisition of the chest was obtained after intravenous administration of 60 ccOmnipaque 350, as per PE protocol. Images were reformatted in coronal and sagittal planes. Axial and coronal maximum intensity projection (MIP) images were created and reviewed.   FINDINGS: POTENTIAL LIMITATIONS OF THE STUDY: None   HEART AND VESSELS: There are no discrete filling defects within main pulmonary artery and its branches to suggest acute pulmonary embolism. Main pulmonary artery and its branches are normal in caliber.   The thoracic aorta normal in course and caliber.There is mild scattered atherosclerosis present, including calcified and noncalcified plaques. No coronary artery calcifications are seen. Please note, the study is not optimized for evaluation of coronary arteries.   The cardiac chambers are not enlarged.   There is no pericardial effusion seen.   MEDIASTINUM AND NORAH, LOWER NECK AND AXILLA: Diminutive visualized thyroid suggests sequelae of prior thyroiditis. No evidence of thoracic lymphadenopathy by CT criteria. Esophagus appears within normal limits as seen.   LUNGS AND AIRWAYS: The trachea and central airways are patent. No endobronchial lesion is seen.   The bilateral lungs are clear without evidence of focal consolidation, pleural effusion, or pneumothorax. Mild emphysema.     UPPER ABDOMEN: The visualized subdiaphragmatic structures demonstrate no remarkable findings.       CHEST WALL AND OSSEOUS STRUCTURES: Chest wall is within normal limits. No acute osseous pathology.There are no suspicious osseous lesions.Mild  discogenic degeneration of the thoracic spine.       No evidence of acute pulmonary embolism. No evidence of acute pathology in the chest.   MACRO: None   Signed by: Noel Shrestha 10/31/2023 12:23 AM Dictation workstation:   [id number]    XR chest 2 views    Result Date: 10/30/2023  Interpreted By:  Ellyn Child, STUDY: XR CHEST 2 VIEWS;   INDICATION: Signs/Symptoms:SOB.   COMPARISON: None   ACCESSION NUMBER(S): [id number]   ORDERING CLINICIAN: BARON GALE   FINDINGS: The cardiac silhouette size is within normal limits. There is no focal consolidation, edema or pneumothorax. No sizeable pleural effusion. Bones demonstrate diffuse osseous demineralization. However no evidence of acute osseous findings.       No acute cardiopulmonary process.   Signed by: Ellyn Child 10/30/2023 10:58 PM Dictation workstation:   [id number]   Results for orders placed or performed during the hospital encounter of 10/30/23 (from the past 24 hour(s))   CBC with Differential   Result Value Ref Range    WBC 11.9 (H) 4.4 - 11.3 x10*3/uL    nRBC 0.0 0.0 - 0.0 /100 WBCs    RBC 3.67 (L) 4.00 - 5.20 x10*6/uL    Hemoglobin 16.5 (H) 12.0 - 16.0 g/dL    Hematocrit 46.4 (H) 36.0 - 46.0 %     (H) 80 - 100 fL    MCH 45.0 (H) 26.0 - 34.0 pg    MCHC 35.6 32.0 - 36.0 g/dL    RDW 12.2 11.5 - 14.5 %    Platelets 280 150 - 450 x10*3/uL    MPV 9.5 7.5 - 11.5 fL    Neutrophils % 81.6 40.0 - 80.0 %    Immature Granulocytes %, Automated 0.5 0.0 - 0.9 %    Lymphocytes % 9.4 13.0 - 44.0 %    Monocytes % 8.2 2.0 - 10.0 %    Eosinophils % 0.0 0.0 - 6.0 %    Basophils % 0.3 0.0 - 2.0 %    Neutrophils Absolute 9.70 (H) 1.20 - 7.70 x10*3/uL    Immature Granulocytes Absolute, Automated 0.06 0.00 - 0.70 x10*3/uL    Lymphocytes Absolute 1.12 (L) 1.20 - 4.80 x10*3/uL    Monocytes Absolute 0.97 0.10 - 1.00 x10*3/uL    Eosinophils Absolute 0.00 0.00 - 0.70 x10*3/uL    Basophils Absolute 0.04 0.00 - 0.10 x10*3/uL   Comprehensive Metabolic Panel   Result  Value Ref Range    Glucose 89 74 - 99 mg/dL    Sodium 133 (L) 136 - 145 mmol/L    Potassium 3.5 3.5 - 5.3 mmol/L    Chloride 93 (L) 98 - 107 mmol/L    Bicarbonate 27 21 - 32 mmol/L    Anion Gap 17 10 - 20 mmol/L    Urea Nitrogen 6 6 - 23 mg/dL    Creatinine 0.63 0.50 - 1.05 mg/dL    eGFR >90 >60 mL/min/1.73m*2    Calcium 9.1 8.6 - 10.3 mg/dL    Albumin 3.8 3.4 - 5.0 g/dL    Alkaline Phosphatase 120 33 - 136 U/L    Total Protein 7.0 6.4 - 8.2 g/dL    AST 39 9 - 39 U/L    Bilirubin, Total 0.7 0.0 - 1.2 mg/dL    ALT 43 7 - 45 U/L   Troponin I, High Sensitivity   Result Value Ref Range    Troponin I, High Sensitivity 5 0 - 13 ng/L   Morphology   Result Value Ref Range    RBC Morphology See Below     Stomatocytes Few    Sars-CoV-2 PCR, Symptomatic   Result Value Ref Range    Coronavirus 2019, PCR Not Detected Not Detected   Influenza A, and B PCR   Result Value Ref Range    Flu A Result Not Detected Not Detected    Flu B Result Not Detected Not Detected   D-dimer, quantitative   Result Value Ref Range    D-Dimer Non VTE, Quant (ng/mL FEU) 1,143 (H) <=500 ng/mL FEU          Assessment/Plan   Principal Problem:    Weakness  Active Problems:    Ambulatory dysfunction    Dysphagia with early satiety    Macrocytosis without anemia    Smoker    Elevated D dimer      Patient presents to this facility with progressive weakness in which she describes as shortness of breath that is progressed over the last several months.  Patient also endorses progressive weakness and early satiety with unintentional weight loss.    Patient states she believes her shortness of breath is driven more by her weakness and physical fatigue.  She denies ever requiring home oxygen.      Patient's weakness thought to be multifactorial.  BMI noted to be less than 20, evidence of protein calorie malnutrition, as well as concern for underlying vitamin and mineral deficiencies.  Macrocytosis noted without concurrent anemia.  We will obtain B12 and folate  levels.  Patient does endorse 1/2 glass of wine daily.  Some suspicion that it might be greater than that contributing to her overall nutritional status and macrocytosis.  Consultation placed for dietitian for nutritional recommendations and assessment.    Discussed PT/OT evaluation and the need for improved physical conditioning.  Patient amenable to working with PT/OT.    Regarding patient's early satiety and unintentional weight loss, CT of the chest was grossly unremarkable.  Given her smoking history, that was of initial concern however no concerning masses, lesions, or lymph nodes were clearly identified.  Will ask GI service to evaluate patient given the alarm features including the unintentional weight loss and early satiety.  Appreciate recommendations and management    Additional supportive measures including acetaminophen as needed for pain or fever, Zofran for nausea    IV fluids as ordered    Home medications to be reviewed and resumed as indicated     I spent >75 minutes in the professional and overall care of this patient.    Lacho Hancock, DO

## 2023-10-31 NOTE — ED PROVIDER NOTES
HPI   Chief Complaint   Patient presents with    Shortness of Breath       63-year-old female presenting to [facility] ED with a complaint of shortness of breath.  She reports having shortness of breath for the past 5 months but has worsened over the weekend in which she is unable to ambulate from 1 room to the next without shortness of breath.  Also endorses dizziness in which she feels off balance and weakness.  States that she fell Saturday when attempting to sit down in a folding chair for smoke in which she fell through and hit her head on the ground.  Denies loss of consciousness.  Admits to vomiting in which she has been having episodes for months when she eats too much food as well as loose stools for months.    Past medical history: Denies  Social history: Smokes half pack per day for 10-12 years as well as drinking a half a glass of wine daily but denies illicit or IV drug use.      History provided by:  Patient                      [place] Coma Scale Score: 15                  Patient History   Past Medical History:   Diagnosis Date    Other specified health status     No pertinent past medical history     No past surgical history on file.  No family history on file.  Social History     Tobacco Use    Smoking status: Not on file    Smokeless tobacco: Not on file   Substance Use Topics    Alcohol use: Not on file    Drug use: Not on file       Physical Exam   ED Triage Vitals [10/30/23 1730]   Temp Heart Rate Resp BP   36.4 °C (97.5 °F) 99 20 140/90      SpO2 Temp src Heart Rate Source Patient Position   100 % -- -- --      BP Location FiO2 (%)     -- --       Physical Exam  Vitals and nursing note reviewed.   Constitutional:       General: She is awake. She is not in acute distress.     Appearance: Normal appearance. She is well-developed.      Comments: Elderly frail appearing woman   HENT:      Head: Normocephalic and atraumatic.      Right Ear: External ear normal.      Left Ear: External ear normal.       Nose: Nose normal. No congestion or rhinorrhea.      Mouth/Throat:      Mouth: Mucous membranes are moist.      Pharynx: Oropharynx is clear. No posterior oropharyngeal erythema.   Eyes:      General: No scleral icterus.        Right eye: No discharge.         Left eye: No discharge.      Extraocular Movements: Extraocular movements intact.      Conjunctiva/sclera: Conjunctivae normal.   Cardiovascular:      Rate and Rhythm: Normal rate and regular rhythm.      Pulses: Normal pulses.      Heart sounds: Normal heart sounds. No murmur heard.     No friction rub.   Pulmonary:      Effort: Pulmonary effort is normal. No respiratory distress.      Breath sounds: Normal breath sounds. No stridor. No wheezing or rales.   Abdominal:      General: There is no distension.      Palpations: Abdomen is soft.      Tenderness: There is no abdominal tenderness. There is no right CVA tenderness, left CVA tenderness, guarding or rebound.   Musculoskeletal:         General: No swelling or tenderness.      Cervical back: Normal range of motion and neck supple.      Right lower leg: No edema.      Left lower leg: No edema.   Skin:     General: Skin is warm and dry.      Capillary Refill: Capillary refill takes less than 2 seconds.      Coloration: Skin is not jaundiced or pale.      Findings: No lesion or rash.   Neurological:      General: No focal deficit present.      Mental Status: She is alert and oriented to person, place, and time. Mental status is at baseline.      Cranial Nerves: No cranial nerve deficit.      Sensory: No sensory deficit.      Motor: No weakness.   Psychiatric:         Mood and Affect: Mood normal.         Behavior: Behavior normal. Behavior is cooperative.         Thought Content: Thought content normal.         Judgment: Judgment normal.         ED Course & MDM   ED Course as of 10/31/23 0239   Mon Oct 30, 2023   2105 Labs returned unimpressive for systemic inflammation or infection, acute anemia or blood  loss, TONI, hepatitis, or electrolyte abnormalities. [ES]   2141 Amb pulse ox 94%, but patient unsteady walking. [ES]   2215 D-Dimer Non VTE, Quant (ng/mL FEU)(!): 1,143  CT angio chest for PE ordered. [ES]   Tue Oct 31, 2023   0003 XR chest 2 views  CXR shows barrel-chest appearance concerning for COPD but otherwise unimpressive for pneumonia, including effusions, infiltrates, or consolidations and no sign of pneumothorax or aortic widening. [ES]   0006 Ambulated patient and has unsteady gait and is endorsing concern for falling. Legs briefly appeared to give-out. Patient lives with  who uses a walker and has A fib.  [ES]   0057 CT angio chest for pulmonary embolism  CT angio chest for PE unimpressive for PE, infiltrates, effusion, consolidation, pericardial effusion, or hemorrhage. [ES]      ED Course User Index  [ES] Joey Alvarenga MD         Diagnoses as of 10/31/23 5393   Shortness of breath   Unsteady gait       Medical Decision Making  63-year-old female with a history mention above presenting to ED with complaint of shortness of breath.    Patient is afebrile, hemodynamically stable on room air, and in no acute distress.  Physical exam findings mentioned above.  CBC, CMP, EKG, troponins, D-dimer, and CXR ordered.  D-dimer returned elevated in which a CT angio chest for PE ordered.    Labs and imaging interpretations per ED course.    Disposition: Discussed with patient who agreed with admission considering unsteady gait and fall risk.        Procedure  Procedures     Joey Alvarenga MD  Resident  10/31/23 4135

## 2023-10-31 NOTE — CONSULTS
"Nutrition Assessment Note  Nutrition Assessment      Reason for Assessment  Reason for Assessment: Provider consult order    History:  Food and Nutrient History  Energy Intake: Fair 50-75 %  Food and Nutrient History: Pt is retired (for the past 5 years). She is vague about her daily routine, but does explain she is usually satisfied with eating once daily. She mentions Nilesh fuentes. She states she doesn't bother with breakfast unless someone comes over to eat with her.  Vitamin/Herbal Supplement Use: Pt denies    Diet Experience  Previous Diet / Nutrition Education / Counseling: Pt denies.  Self-selected diet(s) followed: Pt states she tends to prefer foods that are lower in sodium and is now more sensitive to overly salty foods. She recently has found her taste has changed. She can no longer tolerate \"hot\" (spicey) foods.  Dieting Attempts: Pt did not share that she was trying to lose weight. But admitted her weight is too low at this time.    Food and Nutrient Administration History  Additional Food and Nutrient Administration History: Pt reports her usual weight used to be 130#, but then she got down to 116# just shortly before she retired (~5 years ago) and was warned at that time by her Gynecologist not to lose any more weight. She knows she now weighs 108# and thinks it may be time to change up her eating habits.       Dietary Orders (From admission, onward)       Start     Ordered    10/31/23 0409  Adult diet 2-3 grams sodium  Diet effective now        Question:  Diet type  Answer:  2-3 grams sodium    10/31/23 0408                  Nutrition-Related ADLs and IADLs  Physical Ability to Complete Tasks for Meal Preparation: Yes  Physical Ability to Self-Feed: Yes  Ability to Position Self in Relation to Plate: Yes  Receives Assistance with Intake: No  Ability to Use Adaptive Eating Devices: No  Cognitive Ability to Complete Tasks for Meal Preparation: Yes  Remembers to Eat: No  Recalls Eating: " "Yes    Anthropometrics:  Height: 170.8 cm (5' 7.24\")  Weight: 49 kg (108 lb 0.4 oz)  BMI (Calculated): 16.8    Energy Needs:  1368-2260 kcal (30-35 kcal/kg)  59-64g protein (1.2-1.3g/kg)  1mL/kcal    Nutrition Focused Physical Findings:  Subcutaneous Fat Loss  Orbital Fat Pads: Mild-Moderate (slight dark circles and slight hollowing)  Buccal Fat Pads: Mild-Moderate (flat cheeks, minimal bounce)  Triceps: Mild-moderate (less than ample fat tissue)  Ribs: Defer    Muscle Wasting  Temporalis: Mild-Moderate (slight depression)  Pectoralis (Clavicular Region): Mild-Moderate (some protrusion of clavicle)  Deltoid/Trapezius: Defer  Interosseous: Mild-Moderate (slightly depressed area between thumb and forefinger)  Trapezius/Infraspinatus/Supraspinatus (Scapular Region): Defer  Quadriceps: Defer  Gastrocnemius: Defer    Edema  Edema: none    Physical Findings (Nutrition Deficiency/Toxicity)  Hair: Negative  Eyes: Negative  Mouth: Negative  Nails: Negative  Skin: Negative     Nutrition Diagnosis   Malnutrition Diagnosis  Patient has Malnutrition Diagnosis: No    Patient has Nutrition Diagnosis: Yes  Nutrition Diagnosis 1: Predicted inadequate nutrient intake  Diagnosis Status (1): New  Related to (1): unintended weight loss  As Evidenced by (1): BMI 16.8; wt loss of at least 10# beyond her usual wieght and disinterest in eating with reports of eating ~1 meal a day.     Nutrition Interventions/Recommendations   Nutrition Prescription  Individualized Nutrition Prescription Provided for : Diet    Food and/or Nutrient Delivery Interventions  Interventions: Meals and snacks, Medical food supplement, Vitamin supplement therapy    Meals and Snacks: General healthful diet, Modify schedule of foods/fluids, Modify composition of meals/snacks  Goal: Pt to consume a variety of foods for at least 2-3 meals a day supplemented with snacks incorporating a protein source.        DIET: Suggest liberalizing diet to REGULAR. Encourage pt to " "call in her food preferences for 3 meals daily.      ORAL NUTRITION SUPPLEMENTS: Ensure Plus HP with meals as tolerated (350kca/20g protein each)     Consider daily MVI    Medical Food Supplement: Commercial beverage  Goal: Pt to trial oral nutrition supplement during her hospital stay.     Nutrition Counseling  Goal: Pt to understand the importance of eating more then once daily and the risks of very low body weight.  Counseling Strategies: Nutrition counseling based on relapse prevention strategy  Goal: Pt to consider changing current lifestyle habits to prevent further hospital stays.    Coordination of Nutrition Care by a Nutrition Professional  Collaboration and Referral of Nutrition Care: Collaboration by nutrition professional with other providers  Goal: Case discussed with nursing.    Education Documentation  No documentation found.    Provided \"Tips for increasing calories' from [facility] handout with RD name and # to pt. Pt thought it was time to start making smoothies again and starting to snack more like she used to.     Nutrition Monitoring and Evaluation   Food and Nutrient Related History  Energy Intake: Estimated energy intake  Criteria: Pt to consume >50% of 3 meals daily    Criteria: Pt to consume adequate amount of fluids throughout the day.    Amount of Food: Estimated amout of food  Criteria: Pt to consume >50% of meals 3x/day.       Follow Up  Time Spent (min): 45 minutes  Follow up: Provided inpatient RDN contact information  Last Date of Nutrition Visit: 10/31/23  Nutrition Follow-Up Needed?: 3-8 days     "

## 2023-10-31 NOTE — PROGRESS NOTES
Occupational Therapy    Evaluation/Treatment    Patient Name: Beatris Fontanez  MRN: [id number]  : 1960  Today's Date: 10/31/23  Time Calculation  Start Time: 959  Stop Time:   Time Calculation (min): 16 min       Assessment:  OT Assessment: Patient demonstrates decreased independence with self care, mobility and endurance.  Patient will benefit from skilled OT at a high intensity.  Will continue to follow.  Prognosis: Good  Barriers to Discharge: Decreased caregiver support  Evaluation/Treatment Tolerance: Patient tolerated treatment well  Medical Staff Made Aware: Yes  End of Session Communication: Bedside nurse  End of Session Patient Position: Up in chair, Alarm on  OT Assessment Results: Decreased ADL status, Decreased endurance, Decreased functional mobility  Prognosis: Good  Barriers to Discharge: Decreased caregiver support  Evaluation/Treatment Tolerance: Patient tolerated treatment well  Medical Staff Made Aware: Yes  Barriers to Participation: Housing layout    Plan:  Treatment Interventions: ADL retraining, Functional transfer training, Endurance training  OT Frequency: 5 times per week  OT Discharge Recommendations: High intensity level of continued care  Equipment Recommended upon Discharge: Wheeled walker  OT Recommended Transfer Status: Minimal assist, Assist of 1  Treatment Interventions: ADL retraining, Functional transfer training, Endurance training  Subjective     Current Problem:  Patient Active Problem List   Diagnosis    Weakness    Ambulatory dysfunction    Macrocytosis without anemia    Smoker       General:   OT Received On: 10/31/23  General  Reason for Referral: shortness of breath  Referred By: Dr. Lott  Past Medical History Relevant to Rehab: smoker, dysphagia, macrocytosis  Family/Caregiver Present: No  Prior to Session Communication: Bedside nurse  Patient Position Received: Bed, 2 rail up, Alarm on    Precautions:  Medical Precautions: Fall precautions    Vital  Signs:       Pain:  Pain Assessment  Pain Assessment: 0-10  Pain Score: 0 - No pain  Objective     Cognition:  Overall Cognitive Status: Within Functional Limits      Home Living:  Type of Home: House  Lives With: Alone  Home Layout: Two level  Home Access: Stairs to enter with rails  Entrance Stairs-Number of Steps: 2  Bathroom Shower/Tub: Walk-in shower  Bathroom Equipment: Shower chair with back    Prior Function:  Level of [place]: Independent with ADLs and functional transfers  Prior Function Comments: Reports falls    IADL History:  Current License: Yes  Mode of Transportation: Car  IADL Comments: Does grocery shopping, cleaning and laundry    ADL   Functional Assistance: Minimal  Functional Deficit: Steadying      Activity Tolerance:  Endurance: Endurance does not limit participation in activity    Functional Standing Tolerance:       Bed Mobility/Transfers: Bed Mobility  Bed Mobility: Yes (Patient supine to sit with SBA using handrails.)  Transfers  Transfer: Yes  Transfer 1  Transfer From 1: Sit to  Transfer to 1: Stand  Technique 1: Stand pivot  Transfer Level of Assistance 1: Minimum assistance  Trials/Comments 1: Performed with and without wallker at min A.  Transfers 2  Transfer From 2: Stand to  Transfer to 2: Chair with arms  Technique 2: Stand pivot  Transfer Device 2: Walker  Transfer Level of Assistance 2: Minimum assistance  Trials/Comments 2: Patient ambulated with no device wtih max A and min a with FWW.              Sensation:  Sensation Comment: reports numbness in feet      Extremities: RUE   RUE : Within Functional Limits and LUE   LUE: Within Functional Limits    Outcome Measures: [facility] Daily Activity  Putting on and taking off regular lower body clothing: A lot  Bathing (including washing, rinsing, drying): A little  Putting on and taking off regular upper body clothing: None  Toileting, which includes using toilet, bedpan or urinal: A lot  Taking care of personal grooming such as  brushing teeth: A little  Eating Meals: None  Daily Activity - Total Score: 18  Education Documentation  No documentation found.  Education Comments  No comments found.        EDUCATION:  Education  Individual(s) Educated: Patient  Education Provided: Fall precautons, Risk and benefits of OT discussed with patient or other  Risk and Benefits Discussed with Patient/Caregiver/Other: yes  Patient/Caregiver Demonstrated Understanding: yes  Plan of Care Discussed and Agreed Upon: yes  Patient Response to Education: Patient/Caregiver Verbalized Understanding of Information    Goals:  Encounter Problems       Encounter Problems (Active)       OT Goals       Patient will complete functional transfers with mod I. (Progressing)       Start:  10/31/23    Expected End:  11/14/23            Patient will complete toielting with mod I. (Progressing)       Start:  10/31/23    Expected End:  11/14/23            Patient will complete LE dressing with mod I. (Progressing)       Start:  10/31/23    Expected End:  11/14/23

## 2023-10-31 NOTE — PROGRESS NOTES
Spiritual Care Visit    Clinical Encounter Type  Visited With: Patient  Routine Visit: Introduction  Continue Visiting: Yes         Values/Beliefs  Spiritual Requests During Hospitalization: Patient has a boy friend.                   Advance Directives  Advance Directives Reviewed Date: 10/31/23  Advance Directives Reviewed with: Patient  Comment: Patient noaves she has an Advance Directive. I gave her my card and told her we do it for free if she dos not              Taxonomy  Intended Effects: Aligning care plan with patient's values, Build relationship of care and support, Convey a calming presence  Methods: Offer support    Patient grateful for pastoral care intervention.

## 2023-10-31 NOTE — PROGRESS NOTES
Provided [facility] choice list. Declined [facility] . Wants out patient Physical therapy. Will need a walker to return home. Says her Grandfather is willing to help  and supervise her at home for safety.

## 2023-10-31 NOTE — DISCHARGE SUMMARY
Discharge Diagnosis  Weakness    Issues Requiring Follow-Up  Follow up with PCP within 1 week of discharge  Follow up with GI Dr. Sam outpatient call for appt to schedule EGD and colonoscopy  Discharge Meds     Your medication list        ASK your doctor about these medications        Instructions Last Dose Given Next Dose Due   multivitamin powder in packet                    Test Results Pending At Discharge  Pending Labs       Order Current Status    Folate In process    Pathologist Review-CBC Differential In process    Vitamin B12 In process            Hospital Course   Beatris Fontanez is a 63 y.o. female presenting with chief complaints of shortness of breath which has been ongoing for the past 5 months but has progressively worsened over the last week.  She states that at baseline she is able to complete her ADLs independently.  She does live alone but states that she has a friend that will often spend time with her and check in on her.  She states that even he appreciates more acute decompensation in her conditioning.  Patient is a current smoker and does endorse 1/2 pack/day.  She does drink a half a glass of wine per day but denies any history of substance abuse or EtOH abuse.  States that her appetite has been very poor but she chronically has a suppressed appetite.  She states that she felt like she was doing better over the last year by increasing her intake of fruits and vegetables by eating more smoothies however states that since her favorite smoothie store closed, she has abandon that.  Overall she states she feels worn down.  In addition, patient states that she has had increased difficulty eating.  She describes early satiety which she can only eat half of a sandwich or a few bites of an apple.  She denies any globus sensation or the feeling as if something cannot pass from her esophagus to stomach.  But she states that she believes she has lost weight unintentionally as a result of this.   Laboratory studies upon arrival are grossly unremarkable.  Evidence of macrocytosis but without concurrent anemia.  Patient admitted for further evaluation..     Hospital course:   Patient states she drinks several bottles of wine a week at home and fell prior to admission. She currently smokes 1-2 packs of cigarettes a week. Potassium replaced. IV fluids administered. Urine negative for acute infection. Cxr no acute cardiopulmonary process. CT angio chest-no acute pulmonary embolism. B12 and folate lab ordered. Folate 2.4 B12 was 291 GI was consulted due to patient decreased appetite and weight loss. She will call GI for outpatient EGD and colonoscopy. Seen and evaluated by PT OT, patient refuses rehab inpatient option, is accepting Home with PT OT and walker.     Pertinent Physical Exam At Time of Discharge  Physical Exam  Constitutional:       Comments: Patient is resting comfortably.  Appears frail/thin, is acutely responsive when questioned, displays good insight, alert and oriented x4   HENT:      Head: Normocephalic and atraumatic.      Nose: Nose normal.      Mouth/Throat:      Mouth: Mucous membranes are moist.   Eyes:      Extraocular Movements: Extraocular movements intact.      Conjunctiva/sclera: Conjunctivae normal.      Pupils: Pupils are equal, round, and reactive to light.   Cardiovascular:      Rate and Rhythm: Normal rate and regular rhythm.      Pulses: Normal pulses.      Heart sounds: Normal heart sounds.   Pulmonary:      Effort: Pulmonary effort is normal.      Breath sounds: Normal breath sounds.   Abdominal:      General: Bowel sounds are normal.      Palpations: Abdomen is soft.   Musculoskeletal:         General: Normal range of motion.      Cervical back: Normal range of motion and neck supple.   Skin:     General: Skin is warm and dry.   Neurological:      General: No focal deficit present.      Mental Status: She is alert. Mental status is at baseline.   Psychiatric:         Mood and  Affect: Mood normal.         Behavior: Behavior normal.   Outpatient Follow-Up  No future appointments.    Mallika Canada CNP  [facility]  [address] [address]  Phone: [phone] Fax: [phone]  JULIAN Ya-JAD

## 2023-10-31 NOTE — CARE PLAN
The patient's goals for the shift include patient will be hemodynamically stable through this shift    The clinical goals for the shift include patient will be able to ambulate without pulse dropping below 92% r/a